# Patient Record
Sex: MALE | Race: WHITE | Employment: STUDENT | URBAN - NONMETROPOLITAN AREA
[De-identification: names, ages, dates, MRNs, and addresses within clinical notes are randomized per-mention and may not be internally consistent; named-entity substitution may affect disease eponyms.]

---
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
## 2025-06-27 NOTE — PROGRESS NOTES
25 yo male with history as below submitted per Lilia Bain encounter:    General Medical Concern for your Child's PCP  --------------------------------     Question: Is your child experiencing breathing problems or new or worsening wheezing? Answer: No     Question: Is your child experiencing hives, or swelling of the lips, face or tongue? Answer: No     Question: Has your child recently passed out, almost passed out or turned blue? Answer: No     Question: Is your child experiencing trouble swallowing or has he/she suddenly started drooling? Answer: No     Question: Are you concerned that your child may have a foreign body stuck in any body part, such as throat, ear, eye or nose? Answer: No     Question: Does your child have pain, swelling or redness of an arm or leg with any recent fevers? Answer: No     Question: Is your child having fevers higher than 103? Answer: No     Question: Has your child urinated in the past 8-12 hours? Answer: Yes     Question: Are you concerned that your child may have a broken bone or dislocated joint? Answer: No     Question: Is your child experiencing persistent, abnormal bleeding? Answer: No     Question: Does your child have moderate to severe abdominal pain? Answer: No     Question: Is your child complaining of severe headaches? Answer: No     Question: Has your child recently experienced drooping on one side of his/her face, sudden weakness or inability to move a body part(s)? Answer: No     Question: Has your child recently experienced difficulty speaking, loss of sensation in a body part(s) or confusion? Answer: No     Question: Has your child recently experienced seizures, convulsions or uncontrollable shaking? Answer: No     Question: Is your child complaining of new changes in vision or visual loss? Answer: No     Question: Does your child seem depressed or is he/she expressing suicidal thoughts? Answer:    No     Question: recurrence not specified, unspecified location      Plan:    Requested Prescriptions     Signed Prescriptions Disp Refills    cefdinir (OMNICEF) 300 MG capsule 20 capsule 0     Sig: Take 1 capsule by mouth 2 times daily for 10 days     Authorizing Provider: Kevan Line     Follow up if not better        Electronically signed by Misha Ramires MD on 8/19/2020 at 8:50 AM    5-10 minutes were spent on the digital evaluation and management of this patient. Van